# Patient Record
Sex: FEMALE | Race: WHITE
[De-identification: names, ages, dates, MRNs, and addresses within clinical notes are randomized per-mention and may not be internally consistent; named-entity substitution may affect disease eponyms.]

---

## 2018-12-28 ENCOUNTER — HOSPITAL ENCOUNTER (EMERGENCY)
Dept: HOSPITAL 25 - UCCORT | Age: 38
Discharge: HOME | End: 2018-12-28
Payer: COMMERCIAL

## 2018-12-28 VITALS — SYSTOLIC BLOOD PRESSURE: 127 MMHG | DIASTOLIC BLOOD PRESSURE: 99 MMHG

## 2018-12-28 DIAGNOSIS — H66.91: Primary | ICD-10-CM

## 2018-12-28 DIAGNOSIS — F17.210: ICD-10-CM

## 2018-12-28 DIAGNOSIS — J40: ICD-10-CM

## 2018-12-28 DIAGNOSIS — Z88.2: ICD-10-CM

## 2018-12-28 PROCEDURE — 99203 OFFICE O/P NEW LOW 30 MIN: CPT

## 2018-12-28 PROCEDURE — G0463 HOSPITAL OUTPT CLINIC VISIT: HCPCS

## 2018-12-28 NOTE — UC
General HPI





- HPI Summary


HPI Summary: 





pt presents to the ED for evaluation of her left ear pain and congestion.  her 

and her  have driven up from [place].  pt's mother in law  

unexpectedly on pako day.  they came for the .  pt denies any 

trauma to her left ear.  she took excedrin without relief.  





- History of Current Complaint


Chief Complaint: UCEar


Stated Complaint: HEADACHE/EARS


Hx Obtained From: Patient, Family/Caretaker


Hx Last Menstrual Period: 


Onset/Duration: Gradual Onset


Onset Severity: Moderate


Current Severity: Moderate


Pain Intensity: 10


Associated Signs & Symptoms: Positive: Cough, Headache, Wheezing.  Negative: 

Chest Pain, Dizziness, Diarrhea, Dysuria, Edema, Fever, Nausea, SOB, Vomiting, 

Weakness





- Allergy/Home Medications


Allergies/Adverse Reactions: 


 Allergies











Allergy/AdvReac Type Severity Reaction Status Date / Time


 


Sulfa (Sulfonamide Allergy  Hives Verified 18 20:38





Antibiotics)     











Home Medications: 


 Home Medications





ASA-APAP-Caffeine Es (Nf) [Excedrin Extra Strength 250-250-65 mg (NF)] 2 each 

PO ONCE 18 [History Confirmed 18]











PMH/Surg Hx/FS Hx/Imm Hx


Previously Healthy: Yes





- Surgical History


Surgical History: None





- Social History


Alcohol Use: None


Substance Use Type: None


Smoking Status (MU): Heavy Every Day Tobacco Smoker





Review of Systems


All Other Systems Reviewed And Are Negative: No


Constitutional: Positive: Negative


Skin: Negative: Rash


Eyes: Negative: Eye Redness


ENT: Positive: Ear Ache, Sinus Congestion.  Negative: Dental Pain, Sore Throat, 

Nasal Discharge


Respiratory: Positive: Cough.  Negative: Shortness Of Breath


Cardiovascular: Negative: Palpitations, Chest Pain


Gastrointestinal: Negative: Abdominal Pain, Vomiting, Diarrhea, Nausea


Genitourinary: Negative: Dysuria, Hematuria, Frequency


Motor: Negative: Weakness


Neurovascular: Positive: Negative


Musculoskeletal: Positive: Negative


Neurological: Positive: Headache


Psychological: Positive: Negative


Is Patient Immunocompromised?: No





Physical Exam


Triage Information Reviewed: Yes


Appearance: Well-Nourished, Pain Distress - very mild.  pt has a warm pack over 

her left ear.


Vital Signs: 


 Initial Vital Signs











Temp  97.9 F   18 20:37


 


Pulse  80   18 20:37


 


Resp  26   18 20:37


 


BP  127/99   18 20:37


 


Pulse Ox  100   18 20:37











Eye Exam: Normal


Eyes: Positive: Conjunctiva Clear


ENT: Positive: TM bulging - right, TM dull - right, TM red - right


Neck exam: Normal


Respiratory: Positive: Chest non-tender, No respiratory distress, No accessory 

muscle use, Wheezing - occasional


Cardiovascular: Positive: RRR, No Murmur


Abdomen Description: Positive: Nontender, Soft


Bowel Sounds: Positive: Present


Musculoskeletal Exam: Normal


Neurological: Positive: Alert


Psychological Exam: Normal


Skin Exam: Normal





Course/Dx





- Course


Course Of Treatment: pt has an om and bronchitis.  she is a smoker.  she is not 

near home.  will rx zpak, albuterol inhaler, spacer, and she was given 2 

vicodin for tonight so she can sleep.  pt encouraged to return if worse or any 

new symptoms.   was present for instructions.





- Diagnoses


Provider Diagnosis: 


 Otitis media, Bronchitis








Discharge





- Sign-Out/Discharge


Documenting (check all that apply): Patient Departure


All imaging exams completed and their final reports reviewed: No Studies





- Discharge Plan


Condition: Stable


Disposition: HOME


Prescriptions: 


Azithromyxin ZAHRA (NF) [Z-Zahra (Zithromax) 250 mg tabs #6] 2 tab PO .TODAY, THEN 

1 DAILY #6 tab


Patient Education Materials:  Ear Infection (ED), Acute Bronchitis (ED)


Referrals: 


No Primary Care Phys,NOPCP [Primary Care Provider] - 


[facility] [Provider Group]


Additional Instructions: 


Take the antibiotic as instructed.  take tylenol and motrin for pain or 

discomfort.  return if worse or any new symptoms.  it is important to follow up 

with a doctor.  since you are from out of town, I have given you a referral to 

a pcp in the area.  otherwise, please return to the  for further evaluation.





- Billing Disposition and Condition


Condition: STABLE


Disposition: Home